# Patient Record
Sex: FEMALE | URBAN - METROPOLITAN AREA
[De-identification: names, ages, dates, MRNs, and addresses within clinical notes are randomized per-mention and may not be internally consistent; named-entity substitution may affect disease eponyms.]

---

## 2023-04-12 ENCOUNTER — APPOINTMENT (RX ONLY)
Dept: URBAN - METROPOLITAN AREA CLINIC 126 | Facility: CLINIC | Age: 78
Setting detail: DERMATOLOGY
End: 2023-04-12

## 2023-04-12 DIAGNOSIS — Z41.9 ENCOUNTER FOR PROCEDURE FOR PURPOSES OTHER THAN REMEDYING HEALTH STATE, UNSPECIFIED: ICD-10-CM

## 2023-04-12 PROCEDURE — ? COSMETIC CONSULTATION: BBL

## 2023-04-12 PROCEDURE — ? COSMETIC CONSULTATION: SCITON MOXI

## 2023-04-12 PROCEDURE — ? COSMETIC CONSULTATION: SKIN CARE PRODUCTS AND SERVICES

## 2023-04-12 PROCEDURE — ? PHOTO-DOCUMENTATION

## 2023-04-24 ENCOUNTER — APPOINTMENT (RX ONLY)
Dept: URBAN - METROPOLITAN AREA CLINIC 126 | Facility: CLINIC | Age: 78
Setting detail: DERMATOLOGY
End: 2023-04-24

## 2023-04-24 DIAGNOSIS — Z41.9 ENCOUNTER FOR PROCEDURE FOR PURPOSES OTHER THAN REMEDYING HEALTH STATE, UNSPECIFIED: ICD-10-CM

## 2023-04-24 PROCEDURE — ? SCITON BBL HERO

## 2023-04-24 PROCEDURE — ? SCITON MOXI

## 2023-04-24 ASSESSMENT — LOCATION DETAILED DESCRIPTION DERM
LOCATION DETAILED: RIGHT INFERIOR MEDIAL FOREHEAD
LOCATION DETAILED: LEFT INFERIOR CENTRAL MALAR CHEEK

## 2023-04-24 ASSESSMENT — LOCATION SIMPLE DESCRIPTION DERM
LOCATION SIMPLE: RIGHT FOREHEAD
LOCATION SIMPLE: LEFT CHEEK

## 2023-04-24 ASSESSMENT — LOCATION ZONE DERM: LOCATION ZONE: FACE

## 2023-04-24 NOTE — PROCEDURE: SCITON BBL HERO
Add Setting 4?: no
Temp (C): [address] [facility]
Number Of Prepaid Treatments (Will Not Render If 0): 0
Filter: 515nm Filter
Spot Size: Finesse Adapter Size: 15 x 15 mm square
Filter: 560nm Filter
Fluence (J/Cm2) - Will Not Render If 0: 12
Pulse Width - Will Not Render If 0: 3
Pulse Width Units: milliseconds
Pulse Width - Will Not Render If 0: 15
Spot Size: Finesse Adapter Size: 15 x 45 mm (No Finesse Adapter)
Total Pulses (Optional): [address] [facility]
Fluence (J/Cm2) - Will Not Render If 0: 5
Add Setting 3?: yes
Temp (C): 25
Temp (C): 20
Fluence (J/Cm2) - Will Not Render If 0: [address]
Consent: Written consent obtained. Risks reviewed including but not limited to crusting, scabbing, blistering, scarring, darker or lighter pigmentary change, and incomplete clearance.
Detail Level: Simple
Procedure Note: After applying gel and applying protective eyeware, treatment was administered using the setting parameters listed above.
Total Pulses (Optional): [address] [facility]
Anesthesia Type: 1% lidocaine with epinephrine
Post-Care Instructions: I reviewed with the patient in detail post-care instructions. Patient should avoid sun exposure before and after treatment. Patient should wear sunscreen.
Spot Size: Finesse Adapter Size: 11 mm round
Price (Use Numbers Only, No Special Characters Or $): 5450 [employer]

## 2023-04-24 NOTE — PROCEDURE: SCITON MOXI
Consent: Written consent obtained. Risks were reviewed including but not limited to crusting, scabbing, blistering, scarring, darker or lighter pigmentary change, incomplete improvement, prolonged erythema and facial swelling, infection, and bleeding.
Detail Level: Detailed
% Per Pass: 2.7
Number Of Passes: 6
Anesthesia Type: 1% lidocaine with epinephrine
Energy (J/Cm2): [address] [facility]
Level 3
Treatment Number: 1
Laser Type: Fractionated 1927nm, thulium laser
Post-Care Instructions: I reviewed with the patient in detail post-care instructions. Recommended diligent sun protection and sun avoidance.
Pre=procedure Text: After consent was obtained, the treatment areas were cleaned and treated using the parameters noted above.
Price (Use Numbers Only, No Special Characters Or $): 5989 [employer]
Total Accumulated Energy (J): Jason
Total Coverage (%): 20
External Cooling Fan Speed: 5